# Patient Record
Sex: MALE | Race: WHITE | NOT HISPANIC OR LATINO | URBAN - METROPOLITAN AREA
[De-identification: names, ages, dates, MRNs, and addresses within clinical notes are randomized per-mention and may not be internally consistent; named-entity substitution may affect disease eponyms.]

---

## 2017-07-18 ENCOUNTER — OFFICE VISIT (OUTPATIENT)
Dept: INTERNAL MEDICINE | Facility: CLINIC | Age: 52
End: 2017-07-18
Attending: FAMILY MEDICINE
Payer: MEDICAID

## 2017-07-18 VITALS
TEMPERATURE: 99 F | SYSTOLIC BLOOD PRESSURE: 122 MMHG | WEIGHT: 149.13 LBS | BODY MASS INDEX: 19.76 KG/M2 | HEIGHT: 73 IN | OXYGEN SATURATION: 95 % | HEART RATE: 67 BPM | DIASTOLIC BLOOD PRESSURE: 64 MMHG

## 2017-07-18 DIAGNOSIS — R93.3 ABNORMAL COLONOSCOPY: ICD-10-CM

## 2017-07-18 DIAGNOSIS — Z00.00 ENCOUNTER FOR MEDICAL EXAMINATION TO ESTABLISH CARE: Primary | ICD-10-CM

## 2017-07-18 DIAGNOSIS — R19.8 TENESMUS: ICD-10-CM

## 2017-07-18 DIAGNOSIS — F11.21 OPIOID DEPENDENCE IN REMISSION: ICD-10-CM

## 2017-07-18 DIAGNOSIS — M25.552 LEFT HIP PAIN: ICD-10-CM

## 2017-07-18 DIAGNOSIS — Z12.11 ENCOUNTER FOR SCREENING COLONOSCOPY: ICD-10-CM

## 2017-07-18 DIAGNOSIS — Z72.0 TOBACCO ABUSE: ICD-10-CM

## 2017-07-18 PROCEDURE — 99204 OFFICE O/P NEW MOD 45 MIN: CPT | Mod: S$PBB,,, | Performed by: STUDENT IN AN ORGANIZED HEALTH CARE EDUCATION/TRAINING PROGRAM

## 2017-07-18 PROCEDURE — 99999 PR PBB SHADOW E&M-NEW PATIENT-LVL IV: CPT | Mod: PBBFAC,,, | Performed by: STUDENT IN AN ORGANIZED HEALTH CARE EDUCATION/TRAINING PROGRAM

## 2017-07-18 PROCEDURE — 90715 TDAP VACCINE 7 YRS/> IM: CPT | Mod: PBBFAC,PO

## 2017-07-18 PROCEDURE — 99204 OFFICE O/P NEW MOD 45 MIN: CPT | Mod: PBBFAC,PO | Performed by: STUDENT IN AN ORGANIZED HEALTH CARE EDUCATION/TRAINING PROGRAM

## 2017-07-18 PROCEDURE — 90471 IMMUNIZATION ADMIN: CPT | Mod: PBBFAC,PO

## 2017-07-18 RX ORDER — BUPRENORPHINE HYDROCHLORIDE, NALOXONE HYDROCHLORIDE 8; 2 MG/1; MG/1
FILM, SOLUBLE BUCCAL; SUBLINGUAL
Refills: 0 | COMMUNITY
Start: 2017-07-08 | End: 2018-06-27

## 2017-07-18 RX ORDER — MELOXICAM 7.5 MG/1
7.5 TABLET ORAL DAILY
Qty: 30 TABLET | Refills: 2 | Status: SHIPPED | OUTPATIENT
Start: 2017-07-18 | End: 2017-10-16

## 2017-07-18 NOTE — PROGRESS NOTES
I have been physically present during the critical or key portions of the service furnished by the resident and I have participated in the management of the patient.  Dr. Robles

## 2017-07-18 NOTE — PROGRESS NOTES
Subjective:       Patient ID: Lonny Carnes is a 52 y.o. male.    Chief Complaint: Annual Exam; Establish Care; Back Pain (lower area); Hip Pain (right side); Leg Pain (right side); Pain (cramping in butt area would last about an hour); and Chest Pain (upper left side)    HPI   Lonny Carnes is a 52 y.o. male with pMHX of opiate dependence in remission, prior abnormal colonscopy and active tobacco use who presents today to establish care.   For his opiate dependence he is aPatient of Dr. Armen Mcmillan, [facility] in [place] for suboxone therapy. He has never injected drugs and became addicted to pills after a colon surgery in 2006.      This surgery was prompted by an abnormal colonoscopy finding. The patient states that this was just an obstructing tumor, but he does not believe it was cancer and was unsure if he needed to have a colonoscopy sooner.    He presents today with two complaints. The first is for tenesmus which occurs sporadically and randomly, but it rapidly resolves with preparation H cream. The second complaint is RIGHT hip pain which is ~1mo in duration. It is worsened by periods of standing and walking distances beyond ~100 feet. He describes it as an aching, muscle spasm type pain. He has not had any muscle weakness and the pain does not wake him from sleep; it is purely exertional/weight bearing in nature. He has no history of trauma or instrumentation on that leg or on that hip and no recent falls. He has tried ibuprofen to no effect.     Review of Systems   Constitutional: Negative for chills and fever.   HENT: Negative for congestion, rhinorrhea and sore throat.    Eyes: Negative for redness and itching.   Respiratory: Negative for cough and shortness of breath.    Cardiovascular: Negative for chest pain and palpitations.   Gastrointestinal: Positive for rectal pain. Negative for abdominal pain, constipation, diarrhea, nausea and vomiting.   Genitourinary: Negative for  dysuria, frequency, hematuria and urgency.   Musculoskeletal: Positive for arthralgias (right hip). Negative for myalgias.   Skin: Negative for rash and wound.   Neurological: Negative for dizziness, light-headedness and headaches.       Objective:      Physical Exam   Constitutional: He is oriented to person, place, and time. He appears well-developed and well-nourished. No distress.   HENT:   Head: Normocephalic and atraumatic.   Eyes: EOM are normal. Pupils are equal, round, and reactive to light.   Neck: Normal range of motion. Neck supple. No JVD present. No thyromegaly present.   Cardiovascular: Normal rate, S1 normal and S2 normal.  Exam reveals no gallop and no friction rub.    No murmur heard.  Pulses:       Radial pulses are 2+ on the right side, and 2+ on the left side.   Pulmonary/Chest: Effort normal. He has no wheezes. He has no rhonchi. He has no rales.   Abdominal: Soft. Bowel sounds are normal. He exhibits no distension. There is no tenderness.   Musculoskeletal: Normal range of motion. He exhibits no edema or tenderness.        Right hip: He exhibits normal range of motion, normal strength, no tenderness, no bony tenderness, no crepitus and no deformity.        Left hip: He exhibits normal range of motion, normal strength, no tenderness, no bony tenderness and no crepitus.   Neurological: He is alert and oriented to person, place, and time.   Skin: Skin is warm and dry. No pallor.   Nursing note and vitals reviewed.      Assessment:       1. Encounter for medical examination to establish care    2. Encounter for screening colonoscopy    3. Abnormal colonoscopy    4. Tobacco abuse    5. Left hip pain    6. Opioid dependence in remission    7. Tenesmus        Plan:       1,2,3,4  -CBC CMP Lipid panel  -HCV, HIV given h/o drug use  -Will place case request for sceening c-scope.  -Tdap today    5  -Description of pain is suspicious for OA of right hip.   -Will provide mobic 7.5 daily  -WIll obtain  right hip films.    6  -Continue with Suboxone per Dr. Mcmillan.  -Compliant with therapy and continues to follow with Dr. Mcmillan.    7  Continue prn preparation H    To f/u in 6/12  Case d/w Dr. Travis Toth II, MD  Internal Medicine PGY3  [phone]

## 2017-07-25 DIAGNOSIS — Z12.11 SPECIAL SCREENING FOR MALIGNANT NEOPLASMS, COLON: Primary | ICD-10-CM

## 2017-07-25 RX ORDER — POLYETHYLENE GLYCOL 3350, SODIUM SULFATE ANHYDROUS, SODIUM BICARBONATE, SODIUM CHLORIDE, POTASSIUM CHLORIDE 236; 22.74; 6.74; 5.86; 2.97 G/4L; G/4L; G/4L; G/4L; G/4L
4 POWDER, FOR SOLUTION ORAL ONCE
Qty: 4000 ML | Refills: 0 | Status: SHIPPED | OUTPATIENT
Start: 2017-07-25 | End: 2017-07-25

## 2017-08-07 ENCOUNTER — LAB VISIT (OUTPATIENT)
Dept: LAB | Facility: HOSPITAL | Age: 52
End: 2017-08-07
Attending: STUDENT IN AN ORGANIZED HEALTH CARE EDUCATION/TRAINING PROGRAM
Payer: MEDICAID

## 2017-08-07 DIAGNOSIS — Z00.00 ENCOUNTER FOR MEDICAL EXAMINATION TO ESTABLISH CARE: ICD-10-CM

## 2017-08-07 LAB
ALBUMIN SERPL BCP-MCNC: 3.8 G/DL
ALP SERPL-CCNC: 73 U/L
ALT SERPL W/O P-5'-P-CCNC: 11 U/L
ANION GAP SERPL CALC-SCNC: 6 MMOL/L
AST SERPL-CCNC: 16 U/L
BASOPHILS # BLD AUTO: 0.08 K/UL
BASOPHILS NFR BLD: 1.4 %
BILIRUB SERPL-MCNC: 0.4 MG/DL
BUN SERPL-MCNC: 15 MG/DL
CALCIUM SERPL-MCNC: 9.1 MG/DL
CHLORIDE SERPL-SCNC: 102 MMOL/L
CHOLEST/HDLC SERPL: 3.4 {RATIO}
CO2 SERPL-SCNC: 34 MMOL/L
CREAT SERPL-MCNC: 0.8 MG/DL
DIFFERENTIAL METHOD: ABNORMAL
EOSINOPHIL # BLD AUTO: 0.5 K/UL
EOSINOPHIL NFR BLD: 8.2 %
ERYTHROCYTE [DISTWIDTH] IN BLOOD BY AUTOMATED COUNT: 12.6 %
EST. GFR  (AFRICAN AMERICAN): >60 ML/MIN/1.73 M^2
EST. GFR  (NON AFRICAN AMERICAN): >60 ML/MIN/1.73 M^2
GLUCOSE SERPL-MCNC: 89 MG/DL
HCT VFR BLD AUTO: 39.3 %
HDL/CHOLESTEROL RATIO: 29.1 %
HDLC SERPL-MCNC: 141 MG/DL
HDLC SERPL-MCNC: 41 MG/DL
HGB BLD-MCNC: 13.3 G/DL
LDLC SERPL CALC-MCNC: 89 MG/DL
LYMPHOCYTES # BLD AUTO: 1.6 K/UL
LYMPHOCYTES NFR BLD: 29.1 %
MCH RBC QN AUTO: 30.4 PG
MCHC RBC AUTO-ENTMCNC: 33.8 G/DL
MCV RBC AUTO: 90 FL
MONOCYTES # BLD AUTO: 0.3 K/UL
MONOCYTES NFR BLD: 5.9 %
NEUTROPHILS # BLD AUTO: 3.1 K/UL
NEUTROPHILS NFR BLD: 55 %
NONHDLC SERPL-MCNC: 100 MG/DL
PLATELET # BLD AUTO: 226 K/UL
PMV BLD AUTO: 10.3 FL
POTASSIUM SERPL-SCNC: 3.9 MMOL/L
PROT SERPL-MCNC: 6.9 G/DL
RBC # BLD AUTO: 4.38 M/UL
SODIUM SERPL-SCNC: 142 MMOL/L
TRIGL SERPL-MCNC: 55 MG/DL
WBC # BLD AUTO: 5.64 K/UL

## 2017-08-07 PROCEDURE — 80053 COMPREHEN METABOLIC PANEL: CPT

## 2017-08-07 PROCEDURE — 36415 COLL VENOUS BLD VENIPUNCTURE: CPT | Mod: PO

## 2017-08-07 PROCEDURE — 80061 LIPID PANEL: CPT

## 2017-08-07 PROCEDURE — 85025 COMPLETE CBC W/AUTO DIFF WBC: CPT

## 2017-08-07 PROCEDURE — 86803 HEPATITIS C AB TEST: CPT

## 2017-08-07 PROCEDURE — 86703 HIV-1/HIV-2 1 RESULT ANTBDY: CPT

## 2017-08-08 LAB
HCV AB SERPL QL IA: NEGATIVE
HIV 1+2 AB+HIV1 P24 AG SERPL QL IA: NEGATIVE

## 2017-08-29 ENCOUNTER — PATIENT MESSAGE (OUTPATIENT)
Dept: INTERNAL MEDICINE | Facility: CLINIC | Age: 52
End: 2017-08-29

## 2018-02-14 ENCOUNTER — TELEPHONE (OUTPATIENT)
Dept: INTERNAL MEDICINE | Facility: CLINIC | Age: 53
End: 2018-02-14

## 2018-02-14 NOTE — TELEPHONE ENCOUNTER
Spoke with patient, informed that Dr. Toth's next appt is 3/5/18 offered earlier appt. Patient scheduled 2/20/18.

## 2018-03-06 ENCOUNTER — TELEPHONE (OUTPATIENT)
Dept: ENDOSCOPY | Facility: HOSPITAL | Age: 53
End: 2018-03-06

## 2018-03-12 ENCOUNTER — OFFICE VISIT (OUTPATIENT)
Dept: INTERNAL MEDICINE | Facility: CLINIC | Age: 53
End: 2018-03-12
Attending: FAMILY MEDICINE
Payer: MEDICAID

## 2018-03-12 VITALS
WEIGHT: 155.31 LBS | HEIGHT: 73 IN | DIASTOLIC BLOOD PRESSURE: 80 MMHG | TEMPERATURE: 98 F | OXYGEN SATURATION: 97 % | SYSTOLIC BLOOD PRESSURE: 112 MMHG | BODY MASS INDEX: 20.58 KG/M2 | HEART RATE: 65 BPM

## 2018-03-12 DIAGNOSIS — R41.840 POOR CONCENTRATION: Primary | ICD-10-CM

## 2018-03-12 PROCEDURE — 99999 PR PBB SHADOW E&M-EST. PATIENT-LVL III: CPT | Mod: PBBFAC,,, | Performed by: INTERNAL MEDICINE

## 2018-03-12 PROCEDURE — 99213 OFFICE O/P EST LOW 20 MIN: CPT | Mod: S$PBB,,, | Performed by: INTERNAL MEDICINE

## 2018-03-12 PROCEDURE — 99213 OFFICE O/P EST LOW 20 MIN: CPT | Mod: PBBFAC,PO | Performed by: INTERNAL MEDICINE

## 2018-03-12 NOTE — PROGRESS NOTES
"Subjective:       Patient ID: Lonny Carnes is a 52 y.o. male.    Chief Complaint: Anxiety    Poor concentration, forgetful, QOL "going down hill". Problem remembering and keeping up with appts. 1-2 yrs of forgetfulness/"downhill". forgeting things interfering with work.  Feels intermittent depressed, not constantly.  Denies current drug abuse, taking suboxone for previous opiate addiction.      Review of Systems   Constitutional: Negative for activity change, appetite change and fatigue.   Psychiatric/Behavioral: Positive for decreased concentration, dysphoric mood (at times) and sleep disturbance. Negative for agitation, behavioral problems, confusion, self-injury and suicidal ideas. The patient is not nervous/anxious.        Objective:      Physical Exam   Constitutional: He is oriented to person, place, and time. He appears well-developed and well-nourished. No distress.   Neurological: He is alert and oriented to person, place, and time.   Skin: He is not diaphoretic.   Psychiatric: His behavior is normal. Judgment and thought content normal.   On time. Affect full, thought content not bizarre. Thinking linear, no pressured speech or FOI.       Assessment:       1. Poor concentration        Plan:       Lonny Warner was seen today for anxiety.    Diagnoses and all orders for this visit:    Poor concentration  -     Ambulatory Referral to Psychology  He has endorsed some depressed mood, offered SSRI he declines. I do not think he has an actual memory disorder likely MCI or early dementia.        Follow-up for Dr. Toth.       "

## 2018-03-12 NOTE — Clinical Note
Carlos Contreras, will you please call him and offer f/u with Oleg at [facility]. Thank you, Bean Reagan

## 2018-06-28 ENCOUNTER — TELEPHONE (OUTPATIENT)
Dept: NEUROLOGY | Facility: CLINIC | Age: 53
End: 2018-06-28

## 2018-06-28 NOTE — TELEPHONE ENCOUNTER
Called and spoke with patient's daughter, Meredith. Informed her that we do not have any medicaid spots available as this time. Numbers given to [facility] in [place] [facility]. Meredith verbalized understanding.

## 2018-07-19 ENCOUNTER — TELEPHONE (OUTPATIENT)
Dept: INTERNAL MEDICINE | Facility: CLINIC | Age: 53
End: 2018-07-19

## 2018-07-19 NOTE — TELEPHONE ENCOUNTER
----- Message from Molly Silverio sent at 7/19/2018 12:34 PM CDT -----  Contact: Self [phone]  Patient is a medicaid patient and his PCP was Oleg Toth. Patient states Dr. Toth is leaving and patient needs to establish care with another doctor. Please call to further assist this patient. Patient also states he recently got out of the hospital for [place] and needs a follow up visit as soon as possible.

## 2018-07-31 ENCOUNTER — PATIENT MESSAGE (OUTPATIENT)
Dept: INTERNAL MEDICINE | Facility: CLINIC | Age: 53
End: 2018-07-31